# Patient Record
Sex: MALE | URBAN - METROPOLITAN AREA
[De-identification: names, ages, dates, MRNs, and addresses within clinical notes are randomized per-mention and may not be internally consistent; named-entity substitution may affect disease eponyms.]

---

## 2023-08-11 ENCOUNTER — NURSING HOME VISIT (OUTPATIENT)
Dept: POST ACUTE CARE | Facility: EXTERNAL LOCATION | Age: 88
End: 2023-08-11
Payer: MEDICARE

## 2023-08-11 DIAGNOSIS — I50.9 CONGESTIVE HEART FAILURE, UNSPECIFIED HF CHRONICITY, UNSPECIFIED HEART FAILURE TYPE (MULTI): Primary | ICD-10-CM

## 2023-08-11 DIAGNOSIS — K21.9 GERD WITHOUT ESOPHAGITIS: ICD-10-CM

## 2023-08-11 DIAGNOSIS — R53.1 WEAKNESS: ICD-10-CM

## 2023-08-11 DIAGNOSIS — E78.5 HYPERLIPIDEMIA, UNSPECIFIED HYPERLIPIDEMIA TYPE: ICD-10-CM

## 2023-08-11 DIAGNOSIS — R29.6 FREQUENT FALLS: ICD-10-CM

## 2023-08-11 DIAGNOSIS — I10 HYPERTENSION, ESSENTIAL: ICD-10-CM

## 2023-08-11 DIAGNOSIS — R23.9 ALTERATION IN SKIN INTEGRITY: ICD-10-CM

## 2023-08-11 DIAGNOSIS — F01.C0 SEVERE VASCULAR DEMENTIA WITHOUT BEHAVIORAL DISTURBANCE, PSYCHOTIC DISTURBANCE, MOOD DISTURBANCE, OR ANXIETY (MULTI): ICD-10-CM

## 2023-08-11 PROCEDURE — 99309 SBSQ NF CARE MODERATE MDM 30: CPT

## 2023-08-11 NOTE — LETTER
Patient: Kurt Park  : 1935    Encounter Date: 2023    PROGRESS NOTE    Subjective  Chief complaint: Kurt Park is a 88 y.o. male who is an acute skilled patient being seen and evaluated for weakness    HPI:   patient seen and evaluated for general medical care and routine follow-up.  Patient admitted to SNF for therapy d/t  frequent falls.  PMH includes [place], ASHD, cataract, diverticulosis,  CABG, HTN, dementia, CKD stage III, CHF, HLD.    Family at bedside- states patient requires assistance for everything.  Recent fall  left abrasions on upper back.  Patient also has a stage II coccyx wound for over 1 year.  Patient baseline mentation, confused, cooperative, able to follow simple commands.  Offers no complaints of pain.  CHF and HTN stable.  Denies SOB, cough, chest pain.      Objective  Vital signs: 124/70-64-20-97%    Physical Exam  Constitutional:       Appearance: Normal appearance.   HENT:      Head: Normocephalic.      Mouth/Throat:      Mouth: Mucous membranes are moist.   Eyes:      Extraocular Movements: Extraocular movements intact.   Cardiovascular:      Rate and Rhythm: Normal rate and regular rhythm.      Pulses: Normal pulses.      Heart sounds: Normal heart sounds.   Pulmonary:      Effort: Pulmonary effort is normal.      Breath sounds: Normal breath sounds.   Abdominal:      General: Bowel sounds are normal.      Palpations: Abdomen is soft.   Musculoskeletal:         General: Normal range of motion.      Cervical back: Normal range of motion and neck supple.      Comments:   Generalized weakness   Skin:     General: Skin is warm and dry.      Capillary Refill: Capillary refill takes less than 2 seconds.   Neurological:      Mental Status: He is alert. Mental status is at baseline. He is disoriented.   Psychiatric:         Behavior: Behavior normal.         Assessment/Plan  Problem List Items Addressed This Visit       Dementia (CMS/HCC)      Stable   no agitation or outbursts  per nursing   Seroquel   ensure safe environment   monitor for changes in behavior         Hypertension, essential      Stable   carvedilol lisinopril   monitor         CHF (congestive heart failure) (CMS/[facility]) - Primary      Stable   no SOB, edema, Rales   Carvedilol   Monitor   weights         Frequent falls      reinforced to patient to call for assistance before getting up   therapy         Weakness      therapy         Alteration in skin integrity      stage II coccyx breakdown   wound care to evaluate         Hyperlipidemia      Stable   continue fish oil simvastatin   monitor         GERD without esophagitis      Stable   have patient sit up after meals   Omeprazole   monitor          Medications, treatments, and labs reviewed  Continue medications and treatments as listed in EMR    SONG Khoury      Electronically Signed By: SONG Khoury   8/13/23  5:24 PM

## 2023-08-13 PROBLEM — F03.90 DEMENTIA (MULTI): Status: ACTIVE | Noted: 2023-08-13

## 2023-08-13 PROBLEM — I50.9 CHF (CONGESTIVE HEART FAILURE) (MULTI): Status: ACTIVE | Noted: 2023-08-13

## 2023-08-13 PROBLEM — R29.6 FREQUENT FALLS: Status: ACTIVE | Noted: 2023-08-13

## 2023-08-13 PROBLEM — I10 HYPERTENSION, ESSENTIAL: Status: ACTIVE | Noted: 2023-08-13

## 2023-08-13 PROBLEM — N18.30 CKD (CHRONIC KIDNEY DISEASE) STAGE 3, GFR 30-59 ML/MIN (MULTI): Status: ACTIVE | Noted: 2023-08-13

## 2023-08-13 PROBLEM — R53.1 WEAKNESS: Status: ACTIVE | Noted: 2023-08-13

## 2023-08-13 PROBLEM — R23.9 ALTERATION IN SKIN INTEGRITY: Status: ACTIVE | Noted: 2023-08-13

## 2023-08-13 PROBLEM — E78.5 HYPERLIPIDEMIA: Status: ACTIVE | Noted: 2023-08-13

## 2023-08-13 PROBLEM — K21.9 GERD WITHOUT ESOPHAGITIS: Status: ACTIVE | Noted: 2023-08-13

## 2023-08-13 NOTE — PROGRESS NOTES
PROGRESS NOTE    Subjective   Chief complaint: Kurt Park is a 88 y.o. male who is an acute skilled patient being seen and evaluated for weakness    HPI:   patient seen and evaluated for general medical care and routine follow-up.  Patient admitted to SNF for therapy d/t  frequent falls.  PMH includes [place], ASHD, cataract, diverticulosis,  CABG, HTN, dementia, CKD stage III, CHF, HLD.    Family at bedside- states patient requires assistance for everything.  Recent fall  left abrasions on upper back.  Patient also has a stage II coccyx wound for over 1 year.  Patient baseline mentation, confused, cooperative, able to follow simple commands.  Offers no complaints of pain.  CHF and HTN stable.  Denies SOB, cough, chest pain.      Objective   Vital signs: 124/70-64-20-97%    Physical Exam  Constitutional:       Appearance: Normal appearance.   HENT:      Head: Normocephalic.      Mouth/Throat:      Mouth: Mucous membranes are moist.   Eyes:      Extraocular Movements: Extraocular movements intact.   Cardiovascular:      Rate and Rhythm: Normal rate and regular rhythm.      Pulses: Normal pulses.      Heart sounds: Normal heart sounds.   Pulmonary:      Effort: Pulmonary effort is normal.      Breath sounds: Normal breath sounds.   Abdominal:      General: Bowel sounds are normal.      Palpations: Abdomen is soft.   Musculoskeletal:         General: Normal range of motion.      Cervical back: Normal range of motion and neck supple.      Comments:   Generalized weakness   Skin:     General: Skin is warm and dry.      Capillary Refill: Capillary refill takes less than 2 seconds.   Neurological:      Mental Status: He is alert. Mental status is at baseline. He is disoriented.   Psychiatric:         Behavior: Behavior normal.         Assessment/Plan   Problem List Items Addressed This Visit       Dementia (CMS/HCC)      Stable   no agitation or outbursts per nursing   Seroquel   ensure safe environment   monitor for  changes in behavior         Hypertension, essential      Stable   carvedilol lisinopril   monitor         CHF (congestive heart failure) (CMS/[facility]) - Primary      Stable   no SOB, edema, Rales   Carvedilol   Monitor   weights         Frequent falls      reinforced to patient to call for assistance before getting up   therapy         Weakness      therapy         Alteration in skin integrity      stage II coccyx breakdown   wound care to evaluate         Hyperlipidemia      Stable   continue fish oil simvastatin   monitor         GERD without esophagitis      Stable   have patient sit up after meals   Omeprazole   monitor          Medications, treatments, and labs reviewed  Continue medications and treatments as listed in EMR    Velma Gilmore, APRN-CNP

## 2023-08-13 NOTE — ASSESSMENT & PLAN NOTE
Stable   no agitation or outbursts per nursing   Seroquel   ensure safe environment   monitor for changes in behavior

## 2023-08-17 ENCOUNTER — NURSING HOME VISIT (OUTPATIENT)
Dept: POST ACUTE CARE | Facility: EXTERNAL LOCATION | Age: 88
End: 2023-08-17
Payer: MEDICARE

## 2023-08-17 DIAGNOSIS — R53.1 WEAKNESS: Primary | ICD-10-CM

## 2023-08-17 DIAGNOSIS — I10 HYPERTENSION, ESSENTIAL: ICD-10-CM

## 2023-08-17 PROCEDURE — 99308 SBSQ NF CARE LOW MDM 20: CPT | Performed by: INTERNAL MEDICINE

## 2023-08-17 NOTE — LETTER
Patient: Kurt Park  : 1935    Encounter Date: 2023    PROGRESS NOTE    Subjective  Chief complaint: Kurt Park is a 88 y.o. male who is an acute skilled patient being seen and evaluated for weakness    HPI:  Patient continues to work in therapy due to weakness and debility. Requires assistance for transfers and ADL's. Denies chest pain and headache.       Objective  Vital signs: 95/55, 100%    Physical Exam  Constitutional:       General: He is not in acute distress.  Eyes:      Extraocular Movements: Extraocular movements intact.   Cardiovascular:      Rate and Rhythm: Normal rate and regular rhythm.   Pulmonary:      Effort: Pulmonary effort is normal.      Breath sounds: Normal breath sounds.   Abdominal:      General: Bowel sounds are normal.      Palpations: Abdomen is soft.   Musculoskeletal:      Cervical back: Neck supple.      Right lower leg: No edema.      Left lower leg: No edema.   Neurological:      Mental Status: He is alert.   Psychiatric:         Mood and Affect: Mood normal.         Behavior: Behavior is cooperative.         Assessment/Plan  Problem List Items Addressed This Visit       Hypertension, essential      Stable   carvedilol lisinopril   monitor         Weakness - Primary      therapy          Medications, treatments, and labs reviewed  Continue medications and treatments as listed in [facility]    Scribe Attestation  By signing my name below, IEvon Scriblauren   attest that this documentation has been prepared under the direction and in the presence of Rebecca Rojas MD.    Provider Attestation - Scribe documentation  All medical record entries made by the Scribe were at my direction and personally dictated by me. I have reviewed the chart and agree that the record accurately reflects my personal performance of the history, physical exam, discussion and plan.  1. Weakness        2. Hypertension, essential              Electronically Signed By: Rbeecca Rojas MD    8/17/23  5:47 PM

## 2023-08-17 NOTE — PROGRESS NOTES
PROGRESS NOTE    Subjective   Chief complaint: Kurt Park is a 88 y.o. male who is an acute skilled patient being seen and evaluated for weakness    HPI:  Patient continues to work in therapy due to weakness and debility. Requires assistance for transfers and ADL's. Denies chest pain and headache.       Objective   Vital signs: 95/55, 100%    Physical Exam  Constitutional:       General: He is not in acute distress.  Eyes:      Extraocular Movements: Extraocular movements intact.   Cardiovascular:      Rate and Rhythm: Normal rate and regular rhythm.   Pulmonary:      Effort: Pulmonary effort is normal.      Breath sounds: Normal breath sounds.   Abdominal:      General: Bowel sounds are normal.      Palpations: Abdomen is soft.   Musculoskeletal:      Cervical back: Neck supple.      Right lower leg: No edema.      Left lower leg: No edema.   Neurological:      Mental Status: He is alert.   Psychiatric:         Mood and Affect: Mood normal.         Behavior: Behavior is cooperative.         Assessment/Plan   Problem List Items Addressed This Visit       Hypertension, essential      Stable   carvedilol lisinopril   monitor         Weakness - Primary      therapy          Medications, treatments, and labs reviewed  Continue medications and treatments as listed in [facility]    Scribe Attestation  By signing my name below, IEvon Scribe   attest that this documentation has been prepared under the direction and in the presence of Rebecca Rojas MD.    Provider Attestation - Scribe documentation  All medical record entries made by the Scribe were at my direction and personally dictated by me. I have reviewed the chart and agree that the record accurately reflects my personal performance of the history, physical exam, discussion and plan.  1. Weakness        2. Hypertension, essential

## 2023-08-18 ENCOUNTER — NURSING HOME VISIT (OUTPATIENT)
Dept: POST ACUTE CARE | Facility: EXTERNAL LOCATION | Age: 88
End: 2023-08-18
Payer: MEDICARE

## 2023-08-18 DIAGNOSIS — R53.1 WEAKNESS: ICD-10-CM

## 2023-08-18 DIAGNOSIS — I10 HYPERTENSION, ESSENTIAL: ICD-10-CM

## 2023-08-18 DIAGNOSIS — I50.9 CONGESTIVE HEART FAILURE, UNSPECIFIED HF CHRONICITY, UNSPECIFIED HEART FAILURE TYPE (MULTI): Primary | ICD-10-CM

## 2023-08-18 DIAGNOSIS — F01.C0 SEVERE VASCULAR DEMENTIA WITHOUT BEHAVIORAL DISTURBANCE, PSYCHOTIC DISTURBANCE, MOOD DISTURBANCE, OR ANXIETY (MULTI): ICD-10-CM

## 2023-08-18 PROCEDURE — 99309 SBSQ NF CARE MODERATE MDM 30: CPT

## 2023-08-18 NOTE — LETTER
Patient: Kurt Park  : 1935    Encounter Date: 2023    PROGRESS NOTE    Subjective  Chief complaint: Kurt Park is a 88 y.o. male who is an acute skilled patient being seen and evaluated for weakness    HPI:  Patient seen and evaluated for general medical care.  Patient sitting up at bedside, baseline mentation, calm, cooperative, pleasant.  Offers no complaints at time.  ASHD and CHF stable.  No chest pain, SOB.  Patient continues to work with therapy on GT and strengthening.  Using FWW/CGA with minimal assist.  No concerns per nursing.      Objective  Vital signs:  155/59-97.3-56-18-97%    Physical Exam  Constitutional:       Appearance: Normal appearance.      Comments:   Frail   HENT:      Head: Normocephalic.      Mouth/Throat:      Mouth: Mucous membranes are moist.   Eyes:      Extraocular Movements: Extraocular movements intact.   Cardiovascular:      Rate and Rhythm: Normal rate and regular rhythm.      Pulses: Normal pulses.      Heart sounds: Normal heart sounds.   Pulmonary:      Effort: Pulmonary effort is normal.      Breath sounds: Normal breath sounds.   Abdominal:      General: Bowel sounds are normal.      Palpations: Abdomen is soft.   Musculoskeletal:         General: Normal range of motion.      Cervical back: Normal range of motion and neck supple.      Comments:  generalized weakness   Skin:     General: Skin is warm and dry.      Capillary Refill: Capillary refill takes less than 2 seconds.   Neurological:      Mental Status: He is alert. Mental status is at baseline.   Psychiatric:         Mood and Affect: Mood normal.         Behavior: Behavior normal.         Assessment/Plan  Problem List Items Addressed This Visit       Dementia (CMS/[facility])      Stable   ensure safe environment   Seroquel mirtazapine   monitor         Hypertension, essential      Stable   carvedilol lisinopril   monitor         CHF (congestive heart failure) (CMS/[facility]) - Primary      Stable   no SOB,  Rales, edema   Carvedilol   monitor         Weakness      therapy          Medications, treatments, and labs reviewed  Continue medications and treatments as listed in EMR    SONG Khoury      Electronically Signed By: SONG Khoury   8/21/23  4:11 PM

## 2023-08-21 NOTE — PROGRESS NOTES
PROGRESS NOTE    Subjective   Chief complaint: Kurt Park is a 88 y.o. male who is an acute skilled patient being seen and evaluated for weakness    HPI:  Patient seen and evaluated for general medical care.  Patient sitting up at bedside, baseline mentation, calm, cooperative, pleasant.  Offers no complaints at time.  ASHD and CHF stable.  No chest pain, SOB.  Patient continues to work with therapy on GT and strengthening.  Using FWW/CGA with minimal assist.  No concerns per nursing.      Objective   Vital signs:  155/59-97.3-56-18-97%    Physical Exam  Constitutional:       Appearance: Normal appearance.      Comments:   Frail   HENT:      Head: Normocephalic.      Mouth/Throat:      Mouth: Mucous membranes are moist.   Eyes:      Extraocular Movements: Extraocular movements intact.   Cardiovascular:      Rate and Rhythm: Normal rate and regular rhythm.      Pulses: Normal pulses.      Heart sounds: Normal heart sounds.   Pulmonary:      Effort: Pulmonary effort is normal.      Breath sounds: Normal breath sounds.   Abdominal:      General: Bowel sounds are normal.      Palpations: Abdomen is soft.   Musculoskeletal:         General: Normal range of motion.      Cervical back: Normal range of motion and neck supple.      Comments:  generalized weakness   Skin:     General: Skin is warm and dry.      Capillary Refill: Capillary refill takes less than 2 seconds.   Neurological:      Mental Status: He is alert. Mental status is at baseline.   Psychiatric:         Mood and Affect: Mood normal.         Behavior: Behavior normal.         Assessment/Plan   Problem List Items Addressed This Visit       Dementia (CMS/[facility])      Stable   ensure safe environment   Seroquel mirtazapine   monitor         Hypertension, essential      Stable   carvedilol lisinopril   monitor         CHF (congestive heart failure) (CMS/[facility]) - Primary      Stable   no SOB, Rales, edema   Carvedilol   monitor         Weakness      therapy           Medications, treatments, and labs reviewed  Continue medications and treatments as listed in EMR    Velma Gilmore, APRN-CNP

## 2023-08-22 ENCOUNTER — NURSING HOME VISIT (OUTPATIENT)
Dept: POST ACUTE CARE | Facility: EXTERNAL LOCATION | Age: 88
End: 2023-08-22
Payer: MEDICARE

## 2023-08-22 DIAGNOSIS — I50.9 CONGESTIVE HEART FAILURE, UNSPECIFIED HF CHRONICITY, UNSPECIFIED HEART FAILURE TYPE (MULTI): Primary | ICD-10-CM

## 2023-08-22 DIAGNOSIS — R53.1 WEAKNESS: ICD-10-CM

## 2023-08-22 DIAGNOSIS — F01.C0 SEVERE VASCULAR DEMENTIA WITHOUT BEHAVIORAL DISTURBANCE, PSYCHOTIC DISTURBANCE, MOOD DISTURBANCE, OR ANXIETY (MULTI): ICD-10-CM

## 2023-08-22 DIAGNOSIS — I10 HYPERTENSION, ESSENTIAL: ICD-10-CM

## 2023-08-22 PROCEDURE — 99309 SBSQ NF CARE MODERATE MDM 30: CPT | Performed by: INTERNAL MEDICINE

## 2023-08-22 NOTE — LETTER
Patient: Kurt Park  : 1935    Encounter Date: 2023    PROGRESS NOTE    Subjective  Chief complaint: Kurt Park is a 88 y.o. male who is an acute skilled patient being seen and evaluated for weakness    HPI:  Patient seen and evaluated for general medical care.  Patient sitting up at bedside, baseline mentation, calm, cooperative, pleasant.  Offers no complaints at time.  ASHD and CHF stable.  No chest pain, SOB.  Patient continues to work with therapy on GT and strengthening.  Using FWW/CGA with minimal assist.  No concerns per nursing.    23 Patient working in therapy due to weakness and debility. Ambulates with FWW and CGA several feet. Denies SOB or orthopnea. No new issues or concerns at this time. No acute distress.       Objective  Vital signs:  147/61, 97%    Physical Exam  Constitutional:       Appearance: Normal appearance.      Comments:   Frail   HENT:      Head: Normocephalic.      Mouth/Throat:      Mouth: Mucous membranes are moist.   Eyes:      Extraocular Movements: Extraocular movements intact.   Cardiovascular:      Rate and Rhythm: Normal rate and regular rhythm.      Pulses: Normal pulses.      Heart sounds: Normal heart sounds.   Pulmonary:      Effort: Pulmonary effort is normal.      Breath sounds: Normal breath sounds.   Abdominal:      General: Bowel sounds are normal.      Palpations: Abdomen is soft.   Musculoskeletal:         General: Normal range of motion.      Cervical back: Normal range of motion and neck supple.      Comments:  generalized weakness   Skin:     General: Skin is warm and dry.      Capillary Refill: Capillary refill takes less than 2 seconds.   Neurological:      Mental Status: He is alert. Mental status is at baseline.   Psychiatric:         Mood and Affect: Mood normal.         Behavior: Behavior normal.         Assessment/Plan  Problem List Items Addressed This Visit       Dementia (CMS/HCC)      Stable   ensure safe environment   Seroquel  mirtazapine   monitor         Hypertension, essential       carvedilol lisinopril   monitor         CHF (congestive heart failure) (CMS/HCC) - Primary      Stable   no SOB, Rales, edema   Carvedilol   monitor         Weakness      Continue therapy        Medications, treatments, and labs reviewed  Continue medications and treatments as listed in EMR  Scribe Attestation  Evon JACOBS Scribe   attest that this documentation has been prepared under the direction and in the presence of Rebecca Rojas MD.    Provider Attestation - Scribe documentation  All medical record entries made by the Scribe were at my direction and personally dictated by me. I have reviewed the chart and agree that the record accurately reflects my personal performance of the history, physical exam, discussion and plan.      Rebecca Rojas MD  1. Congestive heart failure, unspecified HF chronicity, unspecified heart failure type (CMS/HCC)        2. Weakness        3. Hypertension, essential        4. 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

## 2023-08-24 ENCOUNTER — NURSING HOME VISIT (OUTPATIENT)
Dept: POST ACUTE CARE | Facility: EXTERNAL LOCATION | Age: 88
End: 2023-08-24
Payer: MEDICARE

## 2023-08-24 DIAGNOSIS — I50.9 CONGESTIVE HEART FAILURE, UNSPECIFIED HF CHRONICITY, UNSPECIFIED HEART FAILURE TYPE (MULTI): ICD-10-CM

## 2023-08-24 DIAGNOSIS — I10 HYPERTENSION, ESSENTIAL: ICD-10-CM

## 2023-08-24 DIAGNOSIS — R53.1 WEAKNESS: Primary | ICD-10-CM

## 2023-08-24 PROCEDURE — 99308 SBSQ NF CARE LOW MDM 20: CPT | Performed by: INTERNAL MEDICINE

## 2023-08-24 NOTE — PROGRESS NOTES
PROGRESS NOTE    Subjective   Chief complaint: Kurt Park is a 88 y.o. male who is an acute skilled patient being seen and evaluated for weakness    HPI:   patient continues to work in therapy due to weakness.  Patient requires assistance with transfers ADLs and mobility.  Denies shortness of breath orthopnea.  Denies chest pain or headache.  No acute distress.      Objective   Vital signs:  128/72, 98%    Physical Exam  Constitutional:       General: He is not in acute distress.  Eyes:      Extraocular Movements: Extraocular movements intact.   Cardiovascular:      Rate and Rhythm: Normal rate and regular rhythm.   Pulmonary:      Effort: Pulmonary effort is normal.      Breath sounds: Normal breath sounds.   Abdominal:      General: Bowel sounds are normal.      Palpations: Abdomen is soft.   Musculoskeletal:      Cervical back: Neck supple.      Right lower leg: No edema.      Left lower leg: No edema.   Neurological:      Mental Status: He is alert.   Psychiatric:         Mood and Affect: Mood normal.         Behavior: Behavior is cooperative.         Assessment/Plan   Problem List Items Addressed This Visit       Hypertension, essential      Stable   carvedilol lisinopril   monitor         CHF (congestive heart failure) (CMS/[facility])      Stable   no SOB, Rales, edema   Carvedilol   monitor         Weakness - Primary      therapy          Medications, treatments, and labs reviewed  Continue medications and treatments as listed in [facility]    Scribe Attestation  By signing my name below, I, Lubna Bejarano   attest that this documentation has been prepared under the direction and in the presence of Rebecca Rojas MD.    Provider Attestation - Scribe documentation  All medical record entries made by the Scribe were at my direction and personally dictated by me. I have reviewed the chart and agree that the record accurately reflects my personal performance of the history, physical exam, discussion and plan.  1.  Weakness        2. Hypertension, essential        3. Congestive heart failure, unspecified HF chronicity, unspecified heart failure type (CMS/HCC)

## 2023-08-26 NOTE — PROGRESS NOTES
PROGRESS NOTE    Subjective   Chief complaint: Kurt Park is a 88 y.o. male who is an acute skilled patient being seen and evaluated for weakness    HPI:  Patient seen and evaluated for general medical care.  Patient sitting up at bedside, baseline mentation, calm, cooperative, pleasant.  Offers no complaints at time.  ASHD and CHF stable.  No chest pain, SOB.  Patient continues to work with therapy on GT and strengthening.  Using FWW/CGA with minimal assist.  No concerns per nursing.    8/22/23 Patient working in therapy due to weakness and debility. Ambulates with FWW and CGA several feet. Denies SOB or orthopnea. No new issues or concerns at this time. No acute distress.       Objective   Vital signs:  147/61, 97%    Physical Exam  Constitutional:       Appearance: Normal appearance.      Comments:   Frail   HENT:      Head: Normocephalic.      Mouth/Throat:      Mouth: Mucous membranes are moist.   Eyes:      Extraocular Movements: Extraocular movements intact.   Cardiovascular:      Rate and Rhythm: Normal rate and regular rhythm.      Pulses: Normal pulses.      Heart sounds: Normal heart sounds.   Pulmonary:      Effort: Pulmonary effort is normal.      Breath sounds: Normal breath sounds.   Abdominal:      General: Bowel sounds are normal.      Palpations: Abdomen is soft.   Musculoskeletal:         General: Normal range of motion.      Cervical back: Normal range of motion and neck supple.      Comments:  generalized weakness   Skin:     General: Skin is warm and dry.      Capillary Refill: Capillary refill takes less than 2 seconds.   Neurological:      Mental Status: He is alert. Mental status is at baseline.   Psychiatric:         Mood and Affect: Mood normal.         Behavior: Behavior normal.         Assessment/Plan   Problem List Items Addressed This Visit       Dementia (CMS/HCC)      Stable   ensure safe environment   Seroquel mirtazapine   monitor         Hypertension, essential       carvedilol  lisinopril   monitor         CHF (congestive heart failure) (CMS/[facility]) - Primary      Stable   no SOB, Rales, edema   Carvedilol   monitor         Weakness      Continue therapy        Medications, treatments, and labs reviewed  Continue medications and treatments as listed in EMR  Scribe Attestation  Evon JACOBS Scriblauren   attest that this documentation has been prepared under the direction and in the presence of Rebecca Rojas MD.    Provider Attestation - Scribe documentation  All medical record entries made by the Scribe were at my direction and personally dictated by me. I have reviewed the chart and agree that the record accurately reflects my personal performance of the history, physical exam, discussion and plan.      Rebecca Rojas MD  1. Congestive heart failure, unspecified HF chronicity, unspecified heart failure type (CMS/[facility])        2. Weakness        3. Hypertension, essential        4. 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

## 2023-09-04 LAB
ALBUMIN (G/DL) IN SER/PLAS: 2.9 G/DL (ref 3.4–5)
ANION GAP IN SER/PLAS: 12 MMOL/L (ref 10–20)
CALCIUM (MG/DL) IN SER/PLAS: 7.9 MG/DL (ref 8.6–10.3)
CARBON DIOXIDE, TOTAL (MMOL/L) IN SER/PLAS: 25 MMOL/L (ref 21–32)
CHLORIDE (MMOL/L) IN SER/PLAS: 101 MMOL/L (ref 98–107)
CREATININE (MG/DL) IN SER/PLAS: 1.48 MG/DL (ref 0.5–1.3)
ERYTHROCYTE DISTRIBUTION WIDTH (RATIO) BY AUTOMATED COUNT: 12.8 % (ref 11.5–14.5)
ERYTHROCYTE MEAN CORPUSCULAR HEMOGLOBIN CONCENTRATION (G/DL) BY AUTOMATED: 31.9 G/DL (ref 32–36)
ERYTHROCYTE MEAN CORPUSCULAR VOLUME (FL) BY AUTOMATED COUNT: 97 FL (ref 80–100)
ERYTHROCYTES (10*6/UL) IN BLOOD BY AUTOMATED COUNT: 3.42 X10E12/L (ref 4.5–5.9)
GFR MALE: 46 ML/MIN/1.73M2
GLUCOSE (MG/DL) IN SER/PLAS: 132 MG/DL (ref 74–99)
HEMATOCRIT (%) IN BLOOD BY AUTOMATED COUNT: 33.2 % (ref 41–52)
HEMOGLOBIN (G/DL) IN BLOOD: 10.6 G/DL (ref 13.5–17.5)
LEUKOCYTES (10*3/UL) IN BLOOD BY AUTOMATED COUNT: 3.6 X10E9/L (ref 4.4–11.3)
MAGNESIUM (MG/DL) IN SER/PLAS: 1.9 MG/DL (ref 1.6–2.4)
PHOSPHATE (MG/DL) IN SER/PLAS: 3.4 MG/DL (ref 2.5–4.9)
PLATELETS (10*3/UL) IN BLOOD AUTOMATED COUNT: 140 X10E9/L (ref 150–450)
POTASSIUM (MMOL/L) IN SER/PLAS: 4 MMOL/L (ref 3.5–5.3)
SODIUM (MMOL/L) IN SER/PLAS: 134 MMOL/L (ref 136–145)
UREA NITROGEN (MG/DL) IN SER/PLAS: 34 MG/DL (ref 6–23)

## 2023-09-12 LAB
ANION GAP IN SER/PLAS: 17 MMOL/L (ref 10–20)
BASOPHILS (10*3/UL) IN BLOOD BY AUTOMATED COUNT: 0.01 X10E9/L (ref 0–0.1)
BASOPHILS/100 LEUKOCYTES IN BLOOD BY AUTOMATED COUNT: 0.1 % (ref 0–2)
CALCIUM (MG/DL) IN SER/PLAS: 8.6 MG/DL (ref 8.6–10.3)
CARBON DIOXIDE, TOTAL (MMOL/L) IN SER/PLAS: 25 MMOL/L (ref 21–32)
CHLORIDE (MMOL/L) IN SER/PLAS: 103 MMOL/L (ref 98–107)
CREATININE (MG/DL) IN SER/PLAS: 1.45 MG/DL (ref 0.5–1.3)
ERYTHROCYTE DISTRIBUTION WIDTH (RATIO) BY AUTOMATED COUNT: 12.8 % (ref 11.5–14.5)
ERYTHROCYTE MEAN CORPUSCULAR HEMOGLOBIN CONCENTRATION (G/DL) BY AUTOMATED: 33.1 G/DL (ref 32–36)
ERYTHROCYTE MEAN CORPUSCULAR VOLUME (FL) BY AUTOMATED COUNT: 95 FL (ref 80–100)
ERYTHROCYTES (10*6/UL) IN BLOOD BY AUTOMATED COUNT: 3.37 X10E12/L (ref 4.5–5.9)
GFR MALE: 47 ML/MIN/1.73M2
GLUCOSE (MG/DL) IN SER/PLAS: 78 MG/DL (ref 74–99)
HEMATOCRIT (%) IN BLOOD BY AUTOMATED COUNT: 32 % (ref 41–52)
HEMOGLOBIN (G/DL) IN BLOOD: 10.6 G/DL (ref 13.5–17.5)
IMMATURE GRANULOCYTES/100 LEUKOCYTES IN BLOOD BY AUTOMATED COUNT: 1.4 % (ref 0–0.9)
LEUKOCYTES (10*3/UL) IN BLOOD BY AUTOMATED COUNT: 16.1 X10E9/L (ref 4.4–11.3)
LYMPHOCYTES (10*3/UL) IN BLOOD BY AUTOMATED COUNT: 0.92 X10E9/L (ref 0.8–3)
LYMPHOCYTES/100 LEUKOCYTES IN BLOOD BY AUTOMATED COUNT: 5.7 % (ref 13–44)
MAGNESIUM (MG/DL) IN SER/PLAS: 2.1 MG/DL (ref 1.6–2.4)
MONOCYTES (10*3/UL) IN BLOOD BY AUTOMATED COUNT: 1.2 X10E9/L (ref 0.05–0.8)
MONOCYTES/100 LEUKOCYTES IN BLOOD BY AUTOMATED COUNT: 7.5 % (ref 2–10)
NEUTROPHILS (10*3/UL) IN BLOOD BY AUTOMATED COUNT: 13.73 X10E9/L (ref 1.6–5.5)
NEUTROPHILS/100 LEUKOCYTES IN BLOOD BY AUTOMATED COUNT: 85.3 % (ref 40–80)
PLATELETS (10*3/UL) IN BLOOD AUTOMATED COUNT: 203 X10E9/L (ref 150–450)
POTASSIUM (MMOL/L) IN SER/PLAS: 4.2 MMOL/L (ref 3.5–5.3)
SODIUM (MMOL/L) IN SER/PLAS: 141 MMOL/L (ref 136–145)
UREA NITROGEN (MG/DL) IN SER/PLAS: 36 MG/DL (ref 6–23)